# Patient Record
Sex: MALE | Race: WHITE | NOT HISPANIC OR LATINO | Employment: FULL TIME | URBAN - METROPOLITAN AREA
[De-identification: names, ages, dates, MRNs, and addresses within clinical notes are randomized per-mention and may not be internally consistent; named-entity substitution may affect disease eponyms.]

---

## 2023-09-21 ENCOUNTER — OFFICE VISIT (OUTPATIENT)
Dept: URGENT CARE | Facility: CLINIC | Age: 28
End: 2023-09-21
Payer: COMMERCIAL

## 2023-09-21 VITALS
BODY MASS INDEX: 31.84 KG/M2 | DIASTOLIC BLOOD PRESSURE: 82 MMHG | HEART RATE: 80 BPM | HEIGHT: 69 IN | TEMPERATURE: 98 F | OXYGEN SATURATION: 98 % | RESPIRATION RATE: 16 BRPM | SYSTOLIC BLOOD PRESSURE: 143 MMHG | WEIGHT: 215 LBS

## 2023-09-21 DIAGNOSIS — K22.4 ESOPHAGEAL SPASM: ICD-10-CM

## 2023-09-21 DIAGNOSIS — K21.9 GASTROESOPHAGEAL REFLUX DISEASE WITHOUT ESOPHAGITIS: ICD-10-CM

## 2023-09-21 DIAGNOSIS — R13.10 DYSPHAGIA, UNSPECIFIED TYPE: Primary | ICD-10-CM

## 2023-09-21 PROCEDURE — 70360 X-RAY EXAM OF NECK: CPT | Mod: S$GLB,,, | Performed by: RADIOLOGY

## 2023-09-21 PROCEDURE — 70360 XR NECK SOFT TISSUE: ICD-10-PCS | Mod: S$GLB,,, | Performed by: RADIOLOGY

## 2023-09-21 PROCEDURE — 99203 PR OFFICE/OUTPT VISIT, NEW, LEVL III, 30-44 MIN: ICD-10-PCS | Mod: S$GLB,,, | Performed by: PHYSICIAN ASSISTANT

## 2023-09-21 PROCEDURE — 99203 OFFICE O/P NEW LOW 30 MIN: CPT | Mod: S$GLB,,, | Performed by: PHYSICIAN ASSISTANT

## 2023-09-21 RX ORDER — SERTRALINE HYDROCHLORIDE 100 MG/1
100 TABLET, FILM COATED ORAL DAILY
COMMUNITY

## 2023-09-21 RX ORDER — HYDROGEN PEROXIDE 3 %
20 SOLUTION, NON-ORAL MISCELLANEOUS
Qty: 30 CAPSULE | Refills: 11 | Status: SHIPPED | OUTPATIENT
Start: 2023-09-21 | End: 2024-09-20

## 2023-09-21 NOTE — PROGRESS NOTES
"Subjective:      Patient ID: Hussein Jasso Jr. is a 28 y.o. male.    Vitals:  height is 5' 9" (1.753 m) and weight is 97.5 kg (215 lb). His oral temperature is 98.3 °F (36.8 °C). His blood pressure is 143/82 (abnormal) and his pulse is 80. His respiration is 16 and oxygen saturation is 98%.     Chief Complaint: Dysphagia    Pt presents to urgent care with what he thinks is something caught in his throat.  He states that the past 2 days he feels like he has something stuck in his throat.  He states that when he eats he feels like it takes longer to go down.  Pain scale-0.     Pt denies belching or burning in stomach. Denies history of acid reflux. Happens each time he eats and drinks. Feels like slower motility.    Other  This is a new problem. The current episode started in the past 7 days. The problem occurs constantly. The problem has been unchanged. Pertinent negatives include no abdominal pain, arthralgias, chest pain, chills, congestion, coughing, diaphoresis, fatigue, fever, headaches, joint swelling, myalgias, nausea, neck pain, numbness, rash, sore throat or vomiting. Nothing aggravates the symptoms. He has tried nothing for the symptoms. The treatment provided no relief.       Constitution: Negative for chills, sweating, fatigue and fever.   HENT:  Positive for trouble swallowing. Negative for ear pain, drooling, congestion, sore throat and voice change.    Neck: Negative for neck pain, neck stiffness and painful lymph nodes.   Cardiovascular:  Negative for chest pain, leg swelling, palpitations, sob on exertion and passing out.   Eyes:  Negative for eye discharge, eye itching, eye pain, eye redness and eyelid swelling.   Respiratory:  Negative for chest tightness, cough, sputum production, bloody sputum, shortness of breath, stridor and wheezing.    Gastrointestinal:  Negative for abdominal pain, abdominal bloating, nausea, vomiting, constipation, diarrhea and heartburn.   Genitourinary:  Negative " for urine decreased.   Musculoskeletal:  Negative for joint pain, joint swelling, abnormal ROM of joint, pain with walking, muscle cramps and muscle ache.   Skin:  Negative for rash and hives.   Allergic/Immunologic: Negative for hives, itching and sneezing.   Neurological:  Negative for dizziness, light-headedness, passing out, loss of balance, headaches, altered mental status, loss of consciousness, numbness and seizures.   Hematologic/Lymphatic: Negative for swollen lymph nodes.   Psychiatric/Behavioral:  Negative for altered mental status and nervous/anxious. The patient is not nervous/anxious.       Objective:     Physical Exam   Constitutional: He is oriented to person, place, and time. He appears well-developed. He is cooperative.   HENT:   Head: Normocephalic and atraumatic.   Ears:   Right Ear: Hearing, tympanic membrane, external ear and ear canal normal.   Left Ear: Hearing, tympanic membrane, external ear and ear canal normal.   Nose: Nose normal. No mucosal edema or nasal deformity. No epistaxis. Right sinus exhibits no maxillary sinus tenderness and no frontal sinus tenderness. Left sinus exhibits no maxillary sinus tenderness and no frontal sinus tenderness.   Mouth/Throat: Uvula is midline, oropharynx is clear and moist and mucous membranes are normal. No trismus in the jaw. Normal dentition. No uvula swelling.   Eyes: Conjunctivae and lids are normal.   Neck: Trachea normal and phonation normal. Neck supple.   Cardiovascular: Normal rate, regular rhythm, normal heart sounds and normal pulses.   Pulmonary/Chest: Effort normal and breath sounds normal.   Abdominal: Normal appearance and bowel sounds are normal. Soft.   Musculoskeletal: Normal range of motion.         General: Normal range of motion.   Neurological: He is alert and oriented to person, place, and time. He exhibits normal muscle tone.   Skin: Skin is warm, dry and intact.   Psychiatric: His speech is normal and behavior is normal.  Judgment and thought content normal.   Nursing note and vitals reviewed.      Assessment:     1. Dysphagia, unspecified type    2. Esophageal spasm    3. Gastroesophageal reflux disease without esophagitis        Plan:       Dysphagia, unspecified type  -     XR NECK SOFT TISSUE; Future; Expected date: 09/21/2023  -     Ambulatory referral/consult to Gastroenterology    Esophageal spasm  -     Ambulatory referral/consult to Gastroenterology    Gastroesophageal reflux disease without esophagitis    Other orders  -     esomeprazole (NEXIUM) 20 MG capsule; Take 1 capsule (20 mg total) by mouth before breakfast.  Dispense: 30 capsule; Refill: 11      Patient Instructions   Tums, pepcid, Reflux Gourmet to start for esophageal spasms and acid reflux. PPI will be prescribed if those measures do not work. GI referral is symptoms worsen.    You must understand that you've received an Urgent Care treatment only and that you may be released before all your medical problems are known or treated. You, the patient, will arrange for follow up care as instructed.  Follow up with your PCP or specialty clinic as directed if not improved or as needed. You can call [phone] to schedule an appointment with the appropriate provider.  If your condition worsens we recommend that you receive another evaluation at the Emergency Department for any concerns or worsening of condition.  Patient aware and verbalized understanding.

## 2023-09-21 NOTE — PATIENT INSTRUCTIONS
Tums, pepcid, Reflux Gourmet to start for esophageal spasms and acid reflux. PPI will be prescribed if those measures do not work. GI referral is symptoms worsen.    You must understand that you've received an Urgent Care treatment only and that you may be released before all your medical problems are known or treated. You, the patient, will arrange for follow up care as instructed.  Follow up with your PCP or specialty clinic as directed if not improved or as needed. You can call [phone] to schedule an appointment with the appropriate provider.  If your condition worsens we recommend that you receive another evaluation at the Emergency Department for any concerns or worsening of condition.  Patient aware and verbalized understanding.

## 2024-10-07 ENCOUNTER — OFFICE VISIT (OUTPATIENT)
Dept: GASTROENTEROLOGY | Facility: CLINIC | Age: 29
End: 2024-10-07
Payer: COMMERCIAL

## 2024-10-07 VITALS — BODY MASS INDEX: 28.77 KG/M2 | WEIGHT: 201 LBS | HEIGHT: 70 IN

## 2024-10-07 DIAGNOSIS — K21.9 GASTROESOPHAGEAL REFLUX DISEASE, UNSPECIFIED WHETHER ESOPHAGITIS PRESENT: Primary | ICD-10-CM

## 2024-10-07 DIAGNOSIS — R12 HEARTBURN: ICD-10-CM

## 2024-10-07 DIAGNOSIS — R13.19 ESOPHAGEAL DYSPHAGIA: ICD-10-CM

## 2024-10-07 DIAGNOSIS — R07.89 OTHER CHEST PAIN: ICD-10-CM

## 2024-10-07 DIAGNOSIS — R12 WATERBRASH: ICD-10-CM

## 2024-10-07 PROCEDURE — 1159F MED LIST DOCD IN RCRD: CPT | Mod: CPTII,S$GLB,,

## 2024-10-07 PROCEDURE — 3044F HG A1C LEVEL LT 7.0%: CPT | Mod: CPTII,S$GLB,,

## 2024-10-07 PROCEDURE — 1160F RVW MEDS BY RX/DR IN RCRD: CPT | Mod: CPTII,S$GLB,,

## 2024-10-07 PROCEDURE — 99999 PR PBB SHADOW E&M-EST. PATIENT-LVL III: CPT | Mod: PBBFAC,,,

## 2024-10-07 PROCEDURE — 3008F BODY MASS INDEX DOCD: CPT | Mod: CPTII,S$GLB,,

## 2024-10-07 PROCEDURE — 99204 OFFICE O/P NEW MOD 45 MIN: CPT | Mod: S$GLB,,,

## 2024-10-07 RX ORDER — ESOMEPRAZOLE MAGNESIUM 40 MG/1
40 CAPSULE, DELAYED RELEASE ORAL
Qty: 90 CAPSULE | Refills: 2 | Status: SHIPPED | OUTPATIENT
Start: 2024-10-07

## 2024-10-07 NOTE — PROGRESS NOTES
Subjective:       Patient ID: Hussein Jasso Jr. is a 29 y.o. male Body mass index is 28.84 kg/m².    Chief Complaint: Gastroesophageal Reflux and Dysphagia    This patient is new to me.  Referring Provider: Yesika Contreras for dysphagia and esophageal spasm.     Gastroesophageal Reflux  He complains of chest pain (Denies currently; reports intermittent chest tightness in the morning; denies history of heart issues), dysphagia (Intermittent sensation coffee is slow to move down esophagus in the morning that improves throughout the day - if he were to eat in the mornings he would have similar symptoms, but he does not eat breakfast; denies use of Heimlich maneuver), heartburn and water brash. He reports no abdominal pain, no belching, no choking, no coughing, no early satiety, no globus sensation, no hoarse voice, no nausea or no sore throat. This is a chronic problem. The current episode started more than 1 year ago. The problem occurs occasionally. The problem has been gradually worsening (Worsened about a month ago). The heartburn duration is more than one hour. The heartburn is located in the substernum. The heartburn is of moderate intensity. The heartburn does not wake him from sleep. The heartburn does not limit his activity. The heartburn doesn't change with position. Nothing aggravates the symptoms. Associated symptoms include weight loss (Intentional; patient reports intermittent fasting and dieting). Pertinent negatives include no fatigue, melena or muscle weakness. Typically has 1 BM every 2-3 days rated stool 3-4 on [place] scale; takes daily fiber supplement. Risk factors include NSAIDs, smoking/tobacco exposure and ETOH use (Occasionally drinks alcohol and takes NSAIDs for back pain; past history of chew tobacco use). He has tried a PPI and head elevation (Currently taking Prilosec 20 mg OTC; past treatments:  Nexium 20 mg; lays on his left side) for the symptoms. The treatment provided no  relief. Past procedures do not include an abdominal ultrasound, an EGD, esophageal manometry, esophageal pH monitoring, H. pylori antibody titer or a UGI. Past invasive treatments do not include gastroplasty, gastroplication or reflux surgery.     Review of Systems   Constitutional:  Positive for weight loss (Intentional; patient reports intermittent fasting and dieting). Negative for activity change, appetite change, chills, diaphoresis, fatigue, fever and unexpected weight change.   HENT:  Positive for trouble swallowing. Negative for hoarse voice and sore throat.    Respiratory:  Negative for cough, choking and shortness of breath.    Cardiovascular:  Positive for chest pain (Denies currently; reports intermittent chest tightness in the morning; denies history of heart issues).   Gastrointestinal:  Positive for dysphagia (Intermittent sensation coffee is slow to move down esophagus in the morning that improves throughout the day - if he were to eat in the mornings he would have similar symptoms, but he does not eat breakfast; denies use of Heimlich maneuver) and heartburn. Negative for abdominal distention, abdominal pain, anal bleeding, blood in stool, constipation, diarrhea, melena, nausea, rectal pain and vomiting.   Musculoskeletal:  Negative for muscle weakness.       No LMP for male patient.  Past Medical History:   Diagnosis Date    GERD (gastroesophageal reflux disease)      History reviewed. No pertinent surgical history.  Family History   Adopted: Yes   Problem Relation Name Age of Onset    Prostate cancer Maternal Grandfather       Social History     Tobacco Use    Smoking status: Never    Smokeless tobacco: Former   Substance Use Topics    Alcohol use: Yes     Comment: occ     Wt Readings from Last 10 Encounters:   10/07/24 91.2 kg (201 lb)   09/21/23 97.5 kg (215 lb)     Lab Results   Component Value Date    TSH 1.11 07/17/2024     Reviewed prior medical records including office visit with S.  KAT Contreras 09/21/23 & endoscopy history (see surgical history).    Objective:      Physical Exam  Vitals and nursing note reviewed.   Constitutional:       General: He is not in acute distress.     Appearance: Normal appearance. He is not ill-appearing.   HENT:      Mouth/Throat:      Lips: Pink. No lesions.   Cardiovascular:      Rate and Rhythm: Normal rate and regular rhythm.      Heart sounds: Normal heart sounds.   Pulmonary:      Effort: Pulmonary effort is normal. No respiratory distress.      Breath sounds: Normal breath sounds.   Abdominal:      General: Bowel sounds are normal. There is no distension or abdominal bruit. There are no signs of injury.      Palpations: Abdomen is soft. There is no shifting dullness, fluid wave, hepatomegaly, splenomegaly or mass.      Tenderness: There is no abdominal tenderness. There is no guarding or rebound. Negative signs include Navarrete's sign, Rovsing's sign and McBurney's sign.   Skin:     General: Skin is warm and dry.      Coloration: Skin is not jaundiced or pale.   Neurological:      Mental Status: He is alert and oriented to person, place, and time.   Psychiatric:         Attention and Perception: Attention normal.         Mood and Affect: Mood normal.         Speech: Speech normal.         Behavior: Behavior normal.         Assessment:       1. Gastroesophageal reflux disease, unspecified whether esophagitis present    2. Heartburn    3. Waterbrash    4. Esophageal dysphagia    5. Other chest pain        Plan:       Gastroesophageal reflux disease, unspecified whether esophagitis present, Heartburn, Waterbrash  - schedule EGD, discussed procedure with patient, including risks and benefits, patient verbalized understanding  -Avoid large meals, avoid eating within 2-3 hours of bedtime (avoid late night eating & lying down soon after eating), elevate head of bed if nocturnal symptoms are present, smoking cessation (if current smoker), & weight loss (if  overweight).   -Avoid known foods which trigger reflux symptoms & to minimize/avoid high-fat foods, chocolate, caffeine, citrus, alcohol, & tomato products.  -Avoid/limit use of NSAID's, since they can cause GI upset, bleeding, and/or ulcers. If needed, take with food.  -CHANGE: esomeprazole (NEXIUM) 40 MG capsule; Take 1 capsule (40 mg total) by mouth before breakfast.  Dispense: 90 capsule; Refill: 2  - discontinue Prilosec due to alternative therapy     Esophageal dysphagia  - schedule EGD, discussed procedure with patient and possible esophageal dilation may be performed during procedure if indicated, patient verbalized understanding  - recommend to eat smaller more frequent meals and to eat slowly and advised to eat a soft diet. Take medications one at a time with a full glass of water.  - possible UGI/esophagram/esophageal manometry if symptoms persist  - CHANGE: esomeprazole (NEXIUM) 40 MG capsule; Take 1 capsule (40 mg total) by mouth before breakfast.  Dispense: 90 capsule; Refill: 2    Other chest pain  - schedule EGD, discussed procedure with patient, including risks and benefits, patient verbalized understanding  - follow-up with PCP for further evaluation and management; if symptom worsens go to ER    Follow up in about 4 weeks (around 11/4/2024), or if symptoms worsen or fail to improve.      If no improvement in symptoms or symptoms worsen, call/follow-up at clinic or go to ER.        Total time spent on the encounter includes face to face time and non-face to face time preparing to see the patient (eg, review of tests), Obtaining and/or reviewing separately obtained history, Documenting clinical information in the electronic or other health record, Independently interpreting results (not separately reported) and communicating results to the patient/family/caregiver, or Care coordination (not separately reported).     A dictation software program was used for this note. Please expect some simple  typographical  errors in this note.

## 2024-10-22 ENCOUNTER — HOSPITAL ENCOUNTER (OUTPATIENT)
Dept: RADIOLOGY | Facility: HOSPITAL | Age: 29
Discharge: HOME | End: 2024-10-22
Payer: COMMERCIAL

## 2024-10-22 ENCOUNTER — OFFICE VISIT (OUTPATIENT)
Dept: GASTROENTEROLOGY | Facility: CLINIC | Age: 29
End: 2024-10-22
Payer: COMMERCIAL

## 2024-10-22 VITALS — WEIGHT: 199.06 LBS | HEIGHT: 70 IN | BODY MASS INDEX: 28.5 KG/M2

## 2024-10-22 DIAGNOSIS — R10.13 EPIGASTRIC ABDOMINAL PAIN: Primary | ICD-10-CM

## 2024-10-22 DIAGNOSIS — R14.0 ABDOMINAL BLOATING: ICD-10-CM

## 2024-10-22 DIAGNOSIS — K59.00 CONSTIPATION, UNSPECIFIED CONSTIPATION TYPE: ICD-10-CM

## 2024-10-22 DIAGNOSIS — R10.13 EPIGASTRIC ABDOMINAL PAIN: ICD-10-CM

## 2024-10-22 DIAGNOSIS — K21.9 GASTROESOPHAGEAL REFLUX DISEASE, UNSPECIFIED WHETHER ESOPHAGITIS PRESENT: ICD-10-CM

## 2024-10-22 PROCEDURE — 74018 RADEX ABDOMEN 1 VIEW: CPT | Mod: 26,,, | Performed by: RADIOLOGY

## 2024-10-22 PROCEDURE — 99999 PR PBB SHADOW E&M-EST. PATIENT-LVL III: CPT | Mod: PBBFAC,,,

## 2024-10-22 PROCEDURE — 3044F HG A1C LEVEL LT 7.0%: CPT | Mod: CPTII,S$GLB,,

## 2024-10-22 PROCEDURE — 1159F MED LIST DOCD IN RCRD: CPT | Mod: CPTII,S$GLB,,

## 2024-10-22 PROCEDURE — 74018 RADEX ABDOMEN 1 VIEW: CPT | Mod: TC,FY,PO

## 2024-10-22 PROCEDURE — 99213 OFFICE O/P EST LOW 20 MIN: CPT | Mod: S$GLB,,,

## 2024-10-22 PROCEDURE — 3008F BODY MASS INDEX DOCD: CPT | Mod: CPTII,S$GLB,,

## 2024-10-22 PROCEDURE — 1160F RVW MEDS BY RX/DR IN RCRD: CPT | Mod: CPTII,S$GLB,,

## 2024-10-22 NOTE — PROGRESS NOTES
Subjective:       Patient ID: Hussein Jasso Jr. is a 29 y.o. male Body mass index is 28.56 kg/m².    Chief Complaint: Gastroesophageal Reflux, Constipation, and Bloated    This patient is new to me.  Referring Provider: Aaareferral Self for dysphagia and esophageal spasm.     Gastroesophageal Reflux  He complains of abdominal pain (CHIEF COMPLAINT:  Constant upper abdominal bloating/pressure that started around the time he began Nexium; unsure if Nexium caused constipation, which lead to bloating/epigastric discomfort). He reports no belching, no chest pain, no choking, no coughing, no dysphagia (Resolved after starting PPI: sensation coffee is slow to move down esophagus in the morning that improves throughout the day - if he were to eat in the mornings he would have similar symptoms, but he does not eat breakfast; denies use of Heimlich maneuver), no early satiety, no globus sensation, no heartburn (resolved), no hoarse voice, no nausea, no sore throat or no water brash. This is a chronic problem. The current episode started more than 1 year ago. The problem occurs occasionally. The problem has been gradually improving. Nothing aggravates the symptoms. Associated symptoms include weight loss (Intentional; patient reports intermittent fasting and dieting). Pertinent negatives include no fatigue, melena or muscle weakness. Intermittent constipation that reoccurred last week; currently having small volume stools every 2 days rated 6 on [place] scale; took Dulcolax with improvement; denies straining, but does not feel complete after BMs; takes daily fiber supplement.  Also has a history of hemorrhoids currently using preparation H suppository with improvement. Risk factors include NSAIDs, smoking/tobacco exposure and ETOH use (Occasionally drinks alcohol and takes NSAIDs for back pain; past history of chew tobacco use). He has tried a PPI and head elevation (Currently taking Nexium 40 mg once daily; past  treatments:  Prilosec 20 mg, Nexium 20 mg; lays on his left side) for the symptoms. The treatment provided significant relief. Past procedures do not include an abdominal ultrasound, an EGD, esophageal manometry, esophageal pH monitoring, H. pylori antibody titer or a UGI. Past invasive treatments do not include gastroplasty, gastroplication or reflux surgery.     Review of Systems   Constitutional:  Positive for weight loss (Intentional; patient reports intermittent fasting and dieting). Negative for activity change, appetite change, chills, diaphoresis, fatigue, fever and unexpected weight change.   HENT:  Positive for trouble swallowing. Negative for hoarse voice and sore throat.    Respiratory:  Negative for cough, choking and shortness of breath.    Cardiovascular:  Negative for chest pain.   Gastrointestinal:  Positive for abdominal pain (CHIEF COMPLAINT:  Constant upper abdominal bloating/pressure that started around the time he began Nexium; unsure if Nexium caused constipation, which lead to bloating/epigastric discomfort). Negative for abdominal distention, anal bleeding, blood in stool, constipation, diarrhea, dysphagia (Resolved after starting PPI: sensation coffee is slow to move down esophagus in the morning that improves throughout the day - if he were to eat in the mornings he would have similar symptoms, but he does not eat breakfast; denies use of Heimlich maneuver), heartburn (resolved), melena, nausea, rectal pain and vomiting.   Musculoskeletal:  Negative for muscle weakness.       No LMP for male patient.  Past Medical History:   Diagnosis Date    GERD (gastroesophageal reflux disease)      No past surgical history on file.  Family History   Adopted: Yes   Problem Relation Name Age of Onset    Prostate cancer Maternal Grandfather       Social History     Tobacco Use    Smoking status: Never    Smokeless tobacco: Former   Substance Use Topics    Alcohol use: Yes     Comment: occ     Wt Readings  from Last 10 Encounters:   10/22/24 90.3 kg (199 lb 1.2 oz)   10/07/24 91.2 kg (201 lb)   09/21/23 97.5 kg (215 lb)     Lab Results   Component Value Date    TSH 1.11 07/17/2024     Reviewed prior medical records including office visit with KAT Mayes 09/21/23, radiology report of KUB 10/22/2024, & endoscopy history (see surgical history).    Objective:      Physical Exam  Vitals and nursing note reviewed.   Constitutional:       General: He is not in acute distress.     Appearance: Normal appearance. He is not ill-appearing.   HENT:      Mouth/Throat:      Lips: Pink. No lesions.   Cardiovascular:      Rate and Rhythm: Normal rate and regular rhythm.      Heart sounds: Normal heart sounds.   Pulmonary:      Effort: Pulmonary effort is normal. No respiratory distress.      Breath sounds: Normal breath sounds.   Abdominal:      General: Bowel sounds are normal. There is no distension or abdominal bruit. There are no signs of injury.      Palpations: Abdomen is soft. There is no shifting dullness, fluid wave, hepatomegaly, splenomegaly or mass.      Tenderness: There is no abdominal tenderness. There is no guarding or rebound. Negative signs include Navarrete's sign, Rovsing's sign and McBurney's sign.   Skin:     General: Skin is warm and dry.      Coloration: Skin is not jaundiced or pale.   Neurological:      Mental Status: He is alert and oriented to person, place, and time.   Psychiatric:         Attention and Perception: Attention normal.         Mood and Affect: Mood normal.         Speech: Speech normal.         Behavior: Behavior normal.         Assessment:       1. Epigastric abdominal pain    2. Abdominal bloating    3. Gastroesophageal reflux disease, unspecified whether esophagitis present    4. Constipation, unspecified constipation type          Plan:       Epigastric abdominal pain  - continue with scheduled EGD  -Avoid/limit use of NSAID's, since they can cause GI upset, bleeding, and/or ulcers.  If needed, take with food.  -     X-Ray Abdomen AP 1 View; Future; Expected date: 10/22/2024  -     Lipase; Future; Expected date: 10/22/2024  - discussed alternative for Nexium due to possible constipation, but patient would like to continue Nexium since he has had significant improvement in reflux symptoms  - consider Carafate  -consider AUS  - testing for H. Pylori typically performed during EGD     Abdominal bloating  - continue with scheduled EGD  - testing for H. Pylori typically performed during EGD   - recommend OTC simethicone as directed, such as Phazyme or Gas-x  - recommend low gas diet: Reduce or eliminate these foods from your diet: Broccoli, Cauliflower, [place] sprouts, Cabbage, Cooked dried beans, Carbonated beverages (sparkling water, soda, beer, champagne)  Other Causes Of Excess Gas Include:   1) EATING TOO FAST or TALKING WHILE YOU CHEW may cause you to swallow air. This increases the amount of gas in the stomach and may worsen your symptoms.  --> Chew each mouthful completely before swallowing. Take your time.  2) OVEREATING may increase the feeling of being bloated and cause more gas.  --> When you are full, stop eating.  3) CONSTIPATION can increase the amount of normal intestinal gas.  --> Avoid constipation by increasing the amount of fiber in your diet by including whole cereal grains, fresh vegetables (except those in the above list) and fresh fruits. High-fiber foods absorb water and carry it out of the body. When increasing the amount of fiber in your diet, you also need to increase the amount of water that you drink. You should drink at least eight 8-ounce glasses of water (two quarts) per day.    Gastroesophageal reflux disease, unspecified whether esophagitis present  - continue with scheduled EGD  -Avoid large meals, avoid eating within 2-3 hours of bedtime (avoid late night eating & lying down soon after eating), elevate head of bed if nocturnal symptoms are present, smoking  cessation (if current smoker), & weight loss (if overweight).   -Avoid known foods which trigger reflux symptoms & to minimize/avoid high-fat foods, chocolate, caffeine, citrus, alcohol, & tomato products.  -Avoid/limit use of NSAID's, since they can cause GI upset, bleeding, and/or ulcers. If needed, take with food.  - discussed alternative for Nexium due to possible constipation, but patient would like to continue Nexium since he has had significant improvement in reflux symptoms    Constipation, unspecified constipation type  Recommend daily exercise as tolerated, adequate water intake (six 8-oz glasses of water daily), and high fiber diet. OTC fiber supplements are recommended if diet does not reach daily fiber goal (20-30 grams daily), such as Metamucil, Citrucel, or FiberCon (take as directed, separate from other oral medications by >2 hours).  -Recommend taking an OTC stool softener such as Colace as directed to avoid hard stools and straining with bowel movements PRN  -Recommend trying OTC MiraLax once daily (17g PO) as directed  - If no improvement with above recommendations, try intermittently dosed Dulcolax OTC as directed (every 3-4 days) PRN to facilitate bowel movements  -If still no improvement with these measures, call/follow-up  - consider colonoscopy     Follow up in about 4 weeks (around 11/19/2024), or if symptoms worsen or fail to improve.      If no improvement in symptoms or symptoms worsen, call/follow-up at clinic or go to ER.        Total time spent on the encounter includes face to face time and non-face to face time preparing to see the patient (eg, review of tests), Obtaining and/or reviewing separately obtained history, Documenting clinical information in the electronic or other health record, Independently interpreting results (not separately reported) and communicating results to the patient/family/caregiver, or Care coordination (not separately reported).     A dictation software  program was used for this note. Please expect some simple typographical  errors in this note.

## 2024-10-25 ENCOUNTER — PATIENT MESSAGE (OUTPATIENT)
Dept: GASTROENTEROLOGY | Facility: CLINIC | Age: 29
End: 2024-10-25
Payer: COMMERCIAL

## 2024-10-25 DIAGNOSIS — K21.9 GASTROESOPHAGEAL REFLUX DISEASE, UNSPECIFIED WHETHER ESOPHAGITIS PRESENT: ICD-10-CM

## 2024-10-25 DIAGNOSIS — R12 HEARTBURN: ICD-10-CM

## 2024-10-25 DIAGNOSIS — R13.19 ESOPHAGEAL DYSPHAGIA: ICD-10-CM

## 2024-10-25 DIAGNOSIS — R12 WATERBRASH: ICD-10-CM

## 2024-10-28 RX ORDER — ESOMEPRAZOLE MAGNESIUM 40 MG/1
40 CAPSULE, DELAYED RELEASE ORAL
Qty: 60 CAPSULE | Refills: 1 | Status: SHIPPED | OUTPATIENT
Start: 2024-10-28

## 2024-11-15 ENCOUNTER — PATIENT MESSAGE (OUTPATIENT)
Dept: GASTROENTEROLOGY | Facility: CLINIC | Age: 29
End: 2024-11-15
Payer: COMMERCIAL

## 2024-11-21 ENCOUNTER — HOSPITAL ENCOUNTER (OUTPATIENT)
Facility: HOSPITAL | Age: 29
Discharge: HOME | End: 2024-11-21
Attending: STUDENT IN AN ORGANIZED HEALTH CARE EDUCATION/TRAINING PROGRAM | Admitting: STUDENT IN AN ORGANIZED HEALTH CARE EDUCATION/TRAINING PROGRAM
Payer: COMMERCIAL

## 2024-11-21 ENCOUNTER — ANESTHESIA (OUTPATIENT)
Dept: ENDOSCOPY | Facility: HOSPITAL | Age: 29
End: 2024-11-21
Payer: COMMERCIAL

## 2024-11-21 ENCOUNTER — ANESTHESIA EVENT (OUTPATIENT)
Dept: ENDOSCOPY | Facility: HOSPITAL | Age: 29
End: 2024-11-21
Payer: COMMERCIAL

## 2024-11-21 DIAGNOSIS — R13.10 DYSPHAGIA, UNSPECIFIED TYPE: Primary | ICD-10-CM

## 2024-11-21 DIAGNOSIS — R13.10 DYSPHAGIA: ICD-10-CM

## 2024-11-21 PROCEDURE — 43239 EGD BIOPSY SINGLE/MULTIPLE: CPT | Mod: ,,, | Performed by: STUDENT IN AN ORGANIZED HEALTH CARE EDUCATION/TRAINING PROGRAM

## 2024-11-21 PROCEDURE — 37000009 HC ANESTHESIA EA ADD 15 MINS: Mod: PO | Performed by: STUDENT IN AN ORGANIZED HEALTH CARE EDUCATION/TRAINING PROGRAM

## 2024-11-21 PROCEDURE — 27201012 HC FORCEPS, HOT/COLD, DISP: Mod: PO | Performed by: STUDENT IN AN ORGANIZED HEALTH CARE EDUCATION/TRAINING PROGRAM

## 2024-11-21 PROCEDURE — 88305 TISSUE EXAM BY PATHOLOGIST: CPT | Mod: 59,PO | Performed by: PATHOLOGY

## 2024-11-21 PROCEDURE — 37000008 HC ANESTHESIA 1ST 15 MINUTES: Mod: PO | Performed by: STUDENT IN AN ORGANIZED HEALTH CARE EDUCATION/TRAINING PROGRAM

## 2024-11-21 PROCEDURE — 63600175 PHARM REV CODE 636 W HCPCS: Mod: PO | Performed by: NURSE ANESTHETIST, CERTIFIED REGISTERED

## 2024-11-21 PROCEDURE — 43239 EGD BIOPSY SINGLE/MULTIPLE: CPT | Mod: PO | Performed by: STUDENT IN AN ORGANIZED HEALTH CARE EDUCATION/TRAINING PROGRAM

## 2024-11-21 RX ORDER — SODIUM CHLORIDE 0.9 % (FLUSH) 0.9 %
10 SYRINGE (ML) INJECTION
Status: DISCONTINUED | OUTPATIENT
Start: 2024-11-21 | End: 2024-11-21 | Stop reason: HOSPADM

## 2024-11-21 RX ORDER — SODIUM CHLORIDE, SODIUM LACTATE, POTASSIUM CHLORIDE, CALCIUM CHLORIDE 600; 310; 30; 20 MG/100ML; MG/100ML; MG/100ML; MG/100ML
INJECTION, SOLUTION INTRAVENOUS CONTINUOUS
Status: DISCONTINUED | OUTPATIENT
Start: 2024-11-21 | End: 2024-11-21 | Stop reason: HOSPADM

## 2024-11-21 RX ORDER — PROPOFOL 10 MG/ML
VIAL (ML) INTRAVENOUS
Status: DISCONTINUED | OUTPATIENT
Start: 2024-11-21 | End: 2024-11-21

## 2024-11-21 RX ORDER — LIDOCAINE HYDROCHLORIDE 20 MG/ML
INJECTION INTRAVENOUS
Status: DISCONTINUED | OUTPATIENT
Start: 2024-11-21 | End: 2024-11-21

## 2024-11-21 RX ADMIN — PROPOFOL 25 MG: 10 INJECTION, EMULSION INTRAVENOUS at 11:11

## 2024-11-21 RX ADMIN — PROPOFOL 100 MG: 10 INJECTION, EMULSION INTRAVENOUS at 11:11

## 2024-11-21 RX ADMIN — PROPOFOL 2 MG: 10 INJECTION, EMULSION INTRAVENOUS at 11:11

## 2024-11-21 RX ADMIN — LIDOCAINE HYDROCHLORIDE 100 MG: 20 INJECTION INTRAVENOUS at 11:11

## 2024-11-21 NOTE — PROVATION PATIENT INSTRUCTIONS
Discharge Summary/Instructions after an Endoscopic Procedure  Patient Name: Hussein Jasso  Patient MRN: [id number]  Patient YOB: 1995 Thursday, November 21, 2024  Dieudonne Humphrey DO  Dear patient,  As a result of recent federal legislation (The Federal Cures Act), you may   receive lab or pathology results from your procedure in your [facility]   account before your physician is able to contact you. Your physician or   their representative will relay the results to you with their   recommendations at their soonest availability.  Thank you,  RESTRICTIONS:  During your procedure today, you received medications for sedation.  These   medications may affect your judgment, balance and coordination.  Therefore,   for 24 hours, you have the following restrictions:   - DO NOT drive a car, operate machinery, make legal/financial decisions,   sign important papers or drink alcohol.    ACTIVITY:  Today: no heavy lifting, straining or running due to procedural   sedation/anesthesia.  The following day: return to full activity including work.  DIET:  Eat and drink normally unless instructed otherwise.     TREATMENT FOR COMMON SIDE EFFECTS:  - Mild abdominal pain, nausea, belching, bloating or excessive gas:  rest,   eat lightly and use a heating pad.  - Sore Throat: treat with throat lozenges and/or gargle with warm salt   water.  - Because air was used during the procedure, expelling large amounts of air   from your rectum or belching is normal.  - If a bowel prep was taken, you may not have a bowel movement for 1-3 days.    This is normal.  SYMPTOMS TO WATCH FOR AND REPORT TO YOUR PHYSICIAN:  1. Abdominal pain or bloating, other than gas cramps.  2. Chest pain.  3. Back pain.  4. Signs of infection such as: chills or fever occurring within 24 hours   after the procedure.  5. Rectal bleeding, which would show as bright red, maroon, or black stools.   (A tablespoon of blood from the rectum is not serious,  especially if   hemorrhoids are present.)  6. Vomiting.  7. Weakness or dizziness.  GO DIRECTLY TO THE NEAREST EMERGENCY ROOM IF YOU HAVE ANY OF THE FOLLOWING:      Difficulty breathing              Chills and/or fever over 101 F   Persistent vomiting and/or vomiting blood   Severe abdominal pain   Severe chest pain   Black, tarry stools   Bleeding- more than one tablespoon   Any other symptom or condition that you feel may need urgent attention  Your doctor recommends these additional instructions:  If any biopsies were taken, your doctors clinic will contact you in 1 to 2   weeks with any results.  You have a contact number available for emergencies.  The signs and symptoms   of potential delayed complications were discussed with you.  You may return   to normal activities tomorrow.  Written discharge instructions were   provided to you.   Resume your previous diet.   Continue your present medications.   We are waiting for your pathology results.   Return to your nurse practitioner as previously scheduled.   You are being discharged to home.  For questions, problems or results please call your physician - Dieudonne Humphrey DO at Work:  [phone].  EMERGENCY PHONE NUMBER: [phone], LAB RESULTS: [phone]  IF A COMPLICATION OR EMERGENCY SITUATION ARISES AND YOU ARE UNABLE TO REACH   YOUR PHYSICIAN - GO DIRECTLY TO THE EMERGENCY ROOM.  ___________________________________________  Nurse Signature  ___________________________________________  Patient/Designated Responsible Party Signature  Dieudonne Humphrey DO  11/21/2024 11:38:04 AM  This report has been verified and signed electronically.  Dear patient,  As a result of recent federal legislation (The Federal Cures Act), you may   receive lab or pathology results from your procedure in your [facility]   account before your physician is able to contact you. Your physician or   their representative will relay the results to you with their    recommendations at their soonest availability.  Thank you.  [employer]ATION

## 2024-11-21 NOTE — ANESTHESIA POSTPROCEDURE EVALUATION
Anesthesia Post Evaluation    Patient: Hussein Jasso Jr.    Procedure(s) Performed: Procedure(s) (LRB):  EGD (ESOPHAGOGASTRODUODENOSCOPY) (N/A)    Final Anesthesia Type: general      Patient location during evaluation: PACU  Patient participation: Yes- Able to Participate  Level of consciousness: awake and alert  Post-procedure vital signs: reviewed and stable  Pain management: adequate  Airway patency: patent    PONV status at discharge: No PONV  Anesthetic complications: no      Cardiovascular status: blood pressure returned to baseline  Respiratory status: unassisted and room air  Hydration status: euvolemic  Follow-up not needed.              Vitals Value Taken Time   BP 98/53 11/21/24 1149   Temp 36.1 °C (97 °F) 11/21/24 1137   Pulse 55 11/21/24 1149   Resp 17 11/21/24 1149   SpO2 97 % 11/21/24 1149         Event Time   Out of Recovery 11:53:00         Pain/Joan Score: Joan Score: 5 (11/21/2024 11:37 AM)

## 2024-11-21 NOTE — TRANSFER OF CARE
"Anesthesia Transfer of Care Note    Patient: Hussein Jasso Jr.    Procedure(s) Performed: Procedure(s) (LRB):  EGD (ESOPHAGOGASTRODUODENOSCOPY) (N/A)    Patient location: PACU    Anesthesia Type: general    Transport from OR: Transported from OR on room air with adequate spontaneous ventilation    Post pain: adequate analgesia    Post assessment: no apparent anesthetic complications and tolerated procedure well    Post vital signs: stable    Level of consciousness: responds to stimulation    Nausea/Vomiting: no nausea/vomiting    Complications: none    Transfer of care protocol was followed    Last vitals: Visit Vitals  /63 (BP Location: Right arm, Patient Position: Lying)   Pulse 67   Temp 36.6 °C (97.9 °F) (Skin)   Resp 17   Ht 5' 10" (1.778 m)   Wt 90.3 kg (199 lb)   SpO2 97%   BMI 28.55 kg/m²     "

## 2024-11-21 NOTE — H&P
History & Physical - Short Stay  Gastroenterology      SUBJECTIVE:     Procedure: EGD    Chief Complaint/Indication for Procedure: Abdominal pain    PTA Medications   Medication Sig    esomeprazole (NEXIUM) 40 MG capsule Take 1 capsule (40 mg total) by mouth 2 (two) times daily before meals.    sertraline (ZOLOFT) 100 MG tablet Take 100 mg by mouth once daily.       Review of patient's allergies indicates:  No Known Allergies     Past Medical History:   Diagnosis Date    GERD (gastroesophageal reflux disease)      History reviewed. No pertinent surgical history.  Family History   Adopted: Yes   Problem Relation Name Age of Onset    Prostate cancer Maternal Grandfather       Social History     Tobacco Use    Smoking status: Never    Smokeless tobacco: Former     Types: Snuff   Substance Use Topics    Alcohol use: Yes     Comment: occ    Drug use: Never         OBJECTIVE:     Vital Signs (Most Recent)  Temp: 97.9 °F (36.6 °C) (11/21/24 1017)  Pulse: 67 (11/21/24 1017)  Resp: 17 (11/21/24 1017)  BP: 119/63 (11/21/24 1017)  SpO2: 97 % (11/21/24 1017)    Physical Exam:                                                       GENERAL:  Comfortable, in no acute distress.                                 HEENT EXAM:  Nonicteric.  No adenopathy.  Oropharynx is clear.               NECK:  Supple.                                                               LUNGS:  Clear.                                                               CARDIAC:  Regular rate and rhythm.  S1, S2.  No murmur.                      ABDOMEN:  Soft, positive bowel sounds, nontender.  No hepatosplenomegaly or masses.  No rebound or guarding.                                             EXTREMITIES:  No edema.     MENTAL STATUS:  Normal, alert and oriented.      ASSESSMENT/PLAN:     Assessment: Abdominal pain    Plan: EGD    Anesthesia Plan: General    ASA Grade: ASA 2 - Patient with mild systemic disease with no functional limitations    MALLAMPATI SCORE:   I (soft palate, uvula, fauces, and tonsillar pillars visible)

## 2024-11-21 NOTE — ANESTHESIA PREPROCEDURE EVALUATION
11/21/2024  Hussein Jasso Jr. is a 29 y.o., male.      Pre-op Assessment          Review of Systems  Hepatic/GI:     GERD         Gerd              Physical Exam  General: Well nourished        Anesthesia Plan  Type of Anesthesia, risks & benefits discussed:    Anesthesia Type: Gen Natural Airway  Intra-op Monitoring Plan: Standard ASA Monitors  Induction:  IV  Informed Consent: Informed consent signed with the Patient and all parties understand the risks and agree with anesthesia plan.  All questions answered.   ASA Score: 1  Day of Surgery Review of History & Physical: H&P Update referred to the surgeon/provider.    Ready For Surgery From Anesthesia Perspective.     .

## 2024-11-22 VITALS
RESPIRATION RATE: 17 BRPM | TEMPERATURE: 97 F | DIASTOLIC BLOOD PRESSURE: 53 MMHG | HEIGHT: 70 IN | OXYGEN SATURATION: 97 % | HEART RATE: 55 BPM | BODY MASS INDEX: 28.49 KG/M2 | WEIGHT: 199 LBS | SYSTOLIC BLOOD PRESSURE: 98 MMHG

## 2024-11-25 ENCOUNTER — PATIENT MESSAGE (OUTPATIENT)
Dept: GASTROENTEROLOGY | Facility: CLINIC | Age: 29
End: 2024-11-25
Payer: COMMERCIAL

## 2024-11-26 DIAGNOSIS — K59.00 CONSTIPATION, UNSPECIFIED CONSTIPATION TYPE: Primary | ICD-10-CM

## 2024-11-26 DIAGNOSIS — R10.13 EPIGASTRIC ABDOMINAL PAIN: ICD-10-CM

## 2024-11-26 RX ORDER — SUCRALFATE 1 G/1
1 TABLET ORAL 4 TIMES DAILY
Qty: 40 TABLET | Refills: 0 | Status: SHIPPED | OUTPATIENT
Start: 2024-11-26 | End: 2024-12-06

## 2024-11-26 NOTE — TELEPHONE ENCOUNTER
Please let the patient know to help improve constipation I have sent in a medication called Linzess to take as prescribed; he can discontinue MiraLax after starting Linzess.  I have also prescribed Carafate to help improve heartburn.  Continue Nexium 40 mg b.i.d. if symptoms continue or do not improve please schedule follow-up in discuss colonoscopy.  Sincerely,  Fay Mayo NP

## 2024-11-27 LAB
FINAL PATHOLOGIC DIAGNOSIS: NORMAL
GROSS: NORMAL
Lab: NORMAL

## 2025-01-14 DIAGNOSIS — R13.19 ESOPHAGEAL DYSPHAGIA: ICD-10-CM

## 2025-01-14 DIAGNOSIS — K21.9 GASTROESOPHAGEAL REFLUX DISEASE, UNSPECIFIED WHETHER ESOPHAGITIS PRESENT: ICD-10-CM

## 2025-01-14 DIAGNOSIS — R12 WATERBRASH: ICD-10-CM

## 2025-01-14 DIAGNOSIS — R12 HEARTBURN: ICD-10-CM

## 2025-01-14 RX ORDER — ESOMEPRAZOLE MAGNESIUM 40 MG/1
CAPSULE, DELAYED RELEASE ORAL
Qty: 180 CAPSULE | Refills: 1 | Status: SHIPPED | OUTPATIENT
Start: 2025-01-14